# Patient Record
Sex: MALE | Race: BLACK OR AFRICAN AMERICAN | NOT HISPANIC OR LATINO | Employment: FULL TIME | URBAN - METROPOLITAN AREA
[De-identification: names, ages, dates, MRNs, and addresses within clinical notes are randomized per-mention and may not be internally consistent; named-entity substitution may affect disease eponyms.]

---

## 2024-08-28 ENCOUNTER — OFFICE VISIT (OUTPATIENT)
Dept: PRIMARY CARE | Facility: CLINIC | Age: 39
End: 2024-08-28
Payer: COMMERCIAL

## 2024-08-28 VITALS
BODY MASS INDEX: 29.03 KG/M2 | DIASTOLIC BLOOD PRESSURE: 98 MMHG | HEART RATE: 78 BPM | HEIGHT: 73 IN | SYSTOLIC BLOOD PRESSURE: 160 MMHG | WEIGHT: 219 LBS | TEMPERATURE: 96.2 F

## 2024-08-28 DIAGNOSIS — E11.9 TYPE 2 DIABETES MELLITUS WITHOUT COMPLICATION, WITHOUT LONG-TERM CURRENT USE OF INSULIN (MULTI): Primary | ICD-10-CM

## 2024-08-28 DIAGNOSIS — I10 PRIMARY HYPERTENSION: ICD-10-CM

## 2024-08-28 PROCEDURE — 1036F TOBACCO NON-USER: CPT | Performed by: INTERNAL MEDICINE

## 2024-08-28 PROCEDURE — 99204 OFFICE O/P NEW MOD 45 MIN: CPT | Performed by: INTERNAL MEDICINE

## 2024-08-28 PROCEDURE — 3080F DIAST BP >= 90 MM HG: CPT | Performed by: INTERNAL MEDICINE

## 2024-08-28 PROCEDURE — 3077F SYST BP >= 140 MM HG: CPT | Performed by: INTERNAL MEDICINE

## 2024-08-28 PROCEDURE — 4010F ACE/ARB THERAPY RXD/TAKEN: CPT | Performed by: INTERNAL MEDICINE

## 2024-08-28 PROCEDURE — 3008F BODY MASS INDEX DOCD: CPT | Performed by: INTERNAL MEDICINE

## 2024-08-28 RX ORDER — AMLODIPINE BESYLATE 10 MG/1
5 TABLET ORAL 2 TIMES DAILY
Qty: 90 TABLET | Refills: 3 | Status: SHIPPED | OUTPATIENT
Start: 2024-08-28

## 2024-08-28 RX ORDER — IRBESARTAN 150 MG/1
150 TABLET ORAL 2 TIMES DAILY
Qty: 180 TABLET | Refills: 3 | Status: SHIPPED | OUTPATIENT
Start: 2024-08-28

## 2024-08-28 RX ORDER — EMPAGLIFLOZIN 25 MG/1
25 TABLET, FILM COATED ORAL
Qty: 90 TABLET | Refills: 3 | Status: SHIPPED | OUTPATIENT
Start: 2024-08-28

## 2024-08-28 RX ORDER — CHLORTHALIDONE 25 MG/1
25 TABLET ORAL
COMMUNITY
End: 2024-08-28 | Stop reason: SDUPTHER

## 2024-08-28 RX ORDER — EMPAGLIFLOZIN 25 MG/1
1 TABLET, FILM COATED ORAL
COMMUNITY
Start: 2024-06-07 | End: 2024-08-28 | Stop reason: SDUPTHER

## 2024-08-28 RX ORDER — BLOOD-GLUCOSE SENSOR
EACH MISCELLANEOUS
Qty: 6 EACH | Refills: 1 | Status: SHIPPED | OUTPATIENT
Start: 2024-08-28

## 2024-08-28 RX ORDER — CHLORTHALIDONE 25 MG/1
25 TABLET ORAL
Qty: 90 TABLET | Refills: 3 | Status: SHIPPED | OUTPATIENT
Start: 2024-08-28

## 2024-08-28 RX ORDER — IRBESARTAN 150 MG/1
150 TABLET ORAL DAILY
COMMUNITY
End: 2024-08-28 | Stop reason: SDUPTHER

## 2024-08-28 RX ORDER — AMLODIPINE BESYLATE 10 MG/1
1 TABLET ORAL
COMMUNITY
Start: 2024-05-06 | End: 2024-08-28 | Stop reason: SDUPTHER

## 2024-08-28 ASSESSMENT — ENCOUNTER SYMPTOMS
CONSTITUTIONAL NEGATIVE: 1
GASTROINTESTINAL NEGATIVE: 1
ARTHRALGIAS: 0
FATIGUE: 0
HYPERTENSION: 1
SHORTNESS OF BREATH: 0
APNEA: 0
BACK PAIN: 0
WEIGHT LOSS: 0
NUMBNESS: 1

## 2024-08-28 NOTE — PROGRESS NOTES
Subjective   Patient ID: Mikey Mena is a 39 y.o. male who presents for Establish Care (Establishing care).  Diabetes  He presents for his initial diabetic visit. He has type 2 diabetes mellitus. The initial diagnosis of diabetes was made 5 years ago. His disease course has been stable. There are no hypoglycemic associated symptoms. Associated symptoms include foot paresthesias. Pertinent negatives for diabetes include no chest pain, no fatigue and no weight loss. There are no hypoglycemic complications. Symptoms are stable. There are no diabetic complications. Risk factors for coronary artery disease include diabetes mellitus, male sex and stress. Current diabetic treatment includes oral agent (monotherapy). He is compliant with treatment all of the time. His weight is stable. He has not had a previous visit with a dietitian. He participates in exercise intermittently. His home blood glucose trend is fluctuating dramatically. An ACE inhibitor/angiotensin II receptor blocker is being taken. Eye exam is current.   Hypertension  This is a chronic problem. The current episode started more than 1 year ago. The problem is unchanged. The problem is resistant. Pertinent negatives include no chest pain, peripheral edema or shortness of breath. Risk factors for coronary artery disease include diabetes mellitus and male gender. Past treatments include angiotensin blockers and calcium channel blockers. The current treatment provides mild improvement. There is no history of angina or kidney disease. There is no history of chronic renal disease.       Past Medical History  History reviewed. No pertinent past medical history.    Social History  Social History     Tobacco Use    Smoking status: Never    Smokeless tobacco: Never   Vaping Use    Vaping status: Former   Substance Use Topics    Alcohol use: Not Currently     Comment: occasionally    Drug use: Yes     Types: Marijuana       Family History     Family History   Problem  "Relation Name Age of Onset    Heart failure Mother      Prostate cancer Father         Allergies:  Allergies   Allergen Reactions    Shellfish Derived GI Upset        Outpatient Medications:  Current Outpatient Medications   Medication Instructions    amLODIPine (NORVASC) 5 mg, oral, 2 times daily    chlorthalidone (HYGROTON) 25 mg, oral, Daily with breakfast    Dexcom G7 Sensor device For continuous glucose monitoring, change sensor after 10 days    irbesartan (AVAPRO) 150 mg, oral, 2 times daily    Jardiance 25 mg, oral, Daily (0630)        Review of Systems   Constitutional: Negative.  Negative for fatigue and weight loss.   HENT: Negative.     Respiratory:  Negative for apnea and shortness of breath.    Cardiovascular:  Negative for chest pain and leg swelling.   Gastrointestinal: Negative.    Musculoskeletal:  Negative for arthralgias and back pain.   Neurological:  Positive for numbness.         Objective       Physical Exam  Constitutional:       Appearance: Normal appearance. He is overweight.   HENT:      Head: Normocephalic.   Eyes:      Conjunctiva/sclera: Conjunctivae normal.   Cardiovascular:      Rate and Rhythm: Normal rate and regular rhythm.      Pulses: Normal pulses.      Heart sounds: Normal heart sounds.   Pulmonary:      Effort: Pulmonary effort is normal.      Breath sounds: Normal breath sounds.   Abdominal:      General: Abdomen is flat.      Palpations: Abdomen is soft.   Musculoskeletal:         General: Normal range of motion.      Cervical back: Neck supple.   Skin:     General: Skin is warm and dry.   Neurological:      General: No focal deficit present.      Mental Status: He is oriented to person, place, and time. Mental status is at baseline.   Psychiatric:         Mood and Affect: Mood normal.     BP (!) 160/98 (BP Location: Left arm, Patient Position: Sitting, BP Cuff Size: Adult)   Pulse 78   Temp 35.7 °C (96.2 °F) (Temporal)   Ht 1.848 m (6' 0.75\")   Wt 99.3 kg (219 lb)   " BMI 29.09 kg/m²      Assessment/Plan   Problem List Items Addressed This Visit       Primary hypertension    Relevant Medications    amLODIPine (Norvasc) 10 mg tablet    irbesartan (Avapro) 150 mg tablet    chlorthalidone (Hygroton) 25 mg tablet    Other Relevant Orders    Comprehensive Metabolic Panel    CBC and Auto Differential    Type 2 diabetes mellitus (Multi) - Primary    Relevant Medications    Jardiance 25 mg    Dexcom G7 Sensor device    Other Relevant Orders    Albumin-Creatinine Ratio, Urine Random    Hemoglobin A1C    Lipid Panel    CBC and Auto Differential    Glutamic Acid Decarboxylase AB    Islet Antigen-2 Antibody   39-year-old male patient came here as a new patient, few months ago he was in [facility] and he was having strokelike symptoms but MRI did not show any stroke I told him.  He was in [place] area then he moved to [facility] and now he is in local area, he was accompanied by his girlfriend.  He is having diabetes at least since 2017 and hypertension also.  He has not have any lab work ER I can see in epic chart.  He is on 2 antihypertensives diuretics and Jardiance, previously was on metformin.  His blood sugars are more than 250 usually he says on his self monitor device.  He would be benefited by continuous glucose monitoring device because of poorly controlled diabetes that will need understanding that what kind of food he can eat and what he can avoid, there is a big role of carbohydrates simple refined carbohydrates needs to be avoided completely.  We will do several laboratories, he has been having vague complaints of tingling numbness sometimes his right upper extremity shakes, he works full-time, he could ambulate without difficulty, there was no motor deficits.  Hemoglobin A1c will be checked, once he has a continuous glucose monitoring device we can have better control of his dietary habits and glycemic control metformin would be the next line of action, current  medications will be continued, amlodipine needs to be divided to 5 mg twice a day he irbesartan will be increased to 150 mg twice a day, he will be called back in 6 weeks, remember diabetes is a chronic disease and it is manage better if we have a team approach , which includes better education, dietary restriction and the self-awareness about hyperglycemia and normal sugar values.  Footcare was reviewed, eye checkup is up to date

## 2024-10-10 ENCOUNTER — APPOINTMENT (OUTPATIENT)
Dept: PRIMARY CARE | Facility: CLINIC | Age: 39
End: 2024-10-10
Payer: COMMERCIAL